# Patient Record
Sex: MALE | URBAN - NONMETROPOLITAN AREA
[De-identification: names, ages, dates, MRNs, and addresses within clinical notes are randomized per-mention and may not be internally consistent; named-entity substitution may affect disease eponyms.]

---

## 2023-11-09 ENCOUNTER — TELEPHONE (OUTPATIENT)
Dept: PRIMARY CARE | Facility: CLINIC | Age: 78
End: 2023-11-09

## 2024-01-24 ENCOUNTER — NURSING HOME VISIT (OUTPATIENT)
Dept: POST ACUTE CARE | Facility: EXTERNAL LOCATION | Age: 79
End: 2024-01-24

## 2024-01-24 DIAGNOSIS — D50.0 IRON DEFICIENCY ANEMIA DUE TO CHRONIC BLOOD LOSS: ICD-10-CM

## 2024-01-24 DIAGNOSIS — Z79.01 ANTICOAGULATION ADEQUATE: ICD-10-CM

## 2024-01-24 DIAGNOSIS — J43.1 PANLOBULAR EMPHYSEMA (MULTI): Primary | ICD-10-CM

## 2024-01-24 DIAGNOSIS — I10 PRIMARY HYPERTENSION: ICD-10-CM

## 2024-01-24 PROCEDURE — 99309 SBSQ NF CARE MODERATE MDM 30: CPT | Performed by: FAMILY MEDICINE

## 2024-01-24 NOTE — LETTER
Patient: Patrick Levy  : 1945    Encounter Date: 2024    Subjective  Patient ID: Patrick Levy is a 78 y.o. male who is long term resident being seen and evaluated for multiple medical problems.    HPI patient being seen now after being readmitted from the hospital.  He does have underlying COPD.  He has had worsening anemia and we have adjusted his Eliquis dosing as well as adjusted GI medications with continued decline noted.  Family deciding about possible hospice consult at this point.    Review of Systems   Constitutional:  Positive for fatigue. Negative for fever.   HENT:  Positive for congestion. Negative for sore throat.    Eyes:  Negative for visual disturbance.   Respiratory:  Positive for cough and shortness of breath. Negative for chest tightness.    Cardiovascular:  Negative for chest pain, palpitations and leg swelling.   Gastrointestinal:  Negative for abdominal pain, constipation, diarrhea, nausea and vomiting.   Genitourinary:  Negative for dysuria, frequency and urgency.   Musculoskeletal:  Positive for arthralgias and gait problem.   Skin:  Negative for rash.   Neurological:  Negative for dizziness, tremors, syncope and headaches.   Hematological:  Bruises/bleeds easily.   Psychiatric/Behavioral:  Negative for dysphoric mood. The patient is not nervous/anxious.        Objective  There were no vitals taken for this visit.    Physical Exam  Vitals (Weight 115 which is down 13 pounds since the end of December blood pressure 126/71 temp 98.1 pulse 90 pulse ox 97% with oxygen) reviewed.   Constitutional:       General: He is not in acute distress.     Appearance: Normal appearance.   HENT:      Head: Atraumatic.      Nose: Nose normal.      Mouth/Throat:      Mouth: Mucous membranes are dry.      Pharynx: Oropharynx is clear.   Eyes:      General: No scleral icterus.     Conjunctiva/sclera: Conjunctivae normal.   Cardiovascular:      Rate and Rhythm: Normal rate and regular rhythm.       Heart sounds:      No gallop.   Pulmonary:      Effort: Pulmonary effort is normal.      Breath sounds: Wheezing present.   Abdominal:      General: There is no distension.      Palpations: There is no mass.      Tenderness: There is no abdominal tenderness. There is no rebound.   Musculoskeletal:         General: No swelling.      Cervical back: Normal range of motion.   Lymphadenopathy:      Cervical: No cervical adenopathy.   Skin:     General: Skin is warm.      Coloration: Skin is not jaundiced.   Neurological:      General: No focal deficit present.         Assessment/Plan  Problem List Items Addressed This Visit    None  Visit Diagnoses         Codes    Panlobular emphysema (CMS/HCC)    -  Primary J43.1    Iron deficiency anemia due to chronic blood loss     D50.0    Anticoagulation adequate     Z79.01    Primary hypertension     I10        CBC CMP pending today.  Consider discontinuing Eliquis if his anemia continues to trend downward.  Continue with GI medications.  He has had significant weight loss over the last month and family is considering hospice consultation for end-stage COPD.     Goals    None           Electronically Signed By: Kath Morin MD   1/25/24  9:25 AM

## 2024-01-25 ASSESSMENT — ENCOUNTER SYMPTOMS
SORE THROAT: 0
FATIGUE: 1
NAUSEA: 0
CONSTIPATION: 0
NERVOUS/ANXIOUS: 0
ARTHRALGIAS: 1
PALPITATIONS: 0
VOMITING: 0
HEADACHES: 0
FEVER: 0
COUGH: 1
FREQUENCY: 0
DYSURIA: 0
TREMORS: 0
BRUISES/BLEEDS EASILY: 1
DYSPHORIC MOOD: 0
CHEST TIGHTNESS: 0
DIZZINESS: 0
DIARRHEA: 0
ABDOMINAL PAIN: 0
SHORTNESS OF BREATH: 1

## 2024-01-25 NOTE — PROGRESS NOTES
Subjective   Patient ID: Patrick Levy is a 78 y.o. male who is long term resident being seen and evaluated for multiple medical problems.    HPI patient being seen now after being readmitted from the hospital.  He does have underlying COPD.  He has had worsening anemia and we have adjusted his Eliquis dosing as well as adjusted GI medications with continued decline noted.  Family deciding about possible hospice consult at this point.    Review of Systems   Constitutional:  Positive for fatigue. Negative for fever.   HENT:  Positive for congestion. Negative for sore throat.    Eyes:  Negative for visual disturbance.   Respiratory:  Positive for cough and shortness of breath. Negative for chest tightness.    Cardiovascular:  Negative for chest pain, palpitations and leg swelling.   Gastrointestinal:  Negative for abdominal pain, constipation, diarrhea, nausea and vomiting.   Genitourinary:  Negative for dysuria, frequency and urgency.   Musculoskeletal:  Positive for arthralgias and gait problem.   Skin:  Negative for rash.   Neurological:  Negative for dizziness, tremors, syncope and headaches.   Hematological:  Bruises/bleeds easily.   Psychiatric/Behavioral:  Negative for dysphoric mood. The patient is not nervous/anxious.        Objective   There were no vitals taken for this visit.    Physical Exam  Vitals (Weight 115 which is down 13 pounds since the end of December blood pressure 126/71 temp 98.1 pulse 90 pulse ox 97% with oxygen) reviewed.   Constitutional:       General: He is not in acute distress.     Appearance: Normal appearance.   HENT:      Head: Atraumatic.      Nose: Nose normal.      Mouth/Throat:      Mouth: Mucous membranes are dry.      Pharynx: Oropharynx is clear.   Eyes:      General: No scleral icterus.     Conjunctiva/sclera: Conjunctivae normal.   Cardiovascular:      Rate and Rhythm: Normal rate and regular rhythm.      Heart sounds:      No gallop.   Pulmonary:      Effort: Pulmonary  effort is normal.      Breath sounds: Wheezing present.   Abdominal:      General: There is no distension.      Palpations: There is no mass.      Tenderness: There is no abdominal tenderness. There is no rebound.   Musculoskeletal:         General: No swelling.      Cervical back: Normal range of motion.   Lymphadenopathy:      Cervical: No cervical adenopathy.   Skin:     General: Skin is warm.      Coloration: Skin is not jaundiced.   Neurological:      General: No focal deficit present.         Assessment/Plan   Problem List Items Addressed This Visit    None  Visit Diagnoses         Codes    Panlobular emphysema (CMS/HCC)    -  Primary J43.1    Iron deficiency anemia due to chronic blood loss     D50.0    Anticoagulation adequate     Z79.01    Primary hypertension     I10        CBC CMP pending today.  Consider discontinuing Eliquis if his anemia continues to trend downward.  Continue with GI medications.  He has had significant weight loss over the last month and family is considering hospice consultation for end-stage COPD.     Goals    None

## 2024-02-21 ENCOUNTER — NURSING HOME VISIT (OUTPATIENT)
Dept: POST ACUTE CARE | Facility: EXTERNAL LOCATION | Age: 79
End: 2024-02-21

## 2024-02-21 DIAGNOSIS — D50.0 IRON DEFICIENCY ANEMIA DUE TO CHRONIC BLOOD LOSS: ICD-10-CM

## 2024-02-21 DIAGNOSIS — I10 PRIMARY HYPERTENSION: ICD-10-CM

## 2024-02-21 DIAGNOSIS — J43.1 PANLOBULAR EMPHYSEMA (MULTI): Primary | ICD-10-CM

## 2024-02-21 PROCEDURE — 99308 SBSQ NF CARE LOW MDM 20: CPT | Performed by: FAMILY MEDICINE

## 2024-02-21 ASSESSMENT — ENCOUNTER SYMPTOMS
CONSTIPATION: 0
DYSPHORIC MOOD: 0
COUGH: 1
NAUSEA: 0
BRUISES/BLEEDS EASILY: 1
VOMITING: 0
SHORTNESS OF BREATH: 1
NERVOUS/ANXIOUS: 0
FATIGUE: 1
ABDOMINAL PAIN: 0
FREQUENCY: 0
SORE THROAT: 0
ARTHRALGIAS: 1
DIARRHEA: 0
CHEST TIGHTNESS: 0
HEADACHES: 0
DIZZINESS: 0
PALPITATIONS: 0
TREMORS: 0
DYSURIA: 0
FEVER: 0

## 2024-02-21 NOTE — PROGRESS NOTES
Subjective   Patient ID: Patrick Levy is a 79 y.o. male who is long term resident being seen and evaluated for multiple medical problems.    HPI patient being seen for monthly visit.  He continues to decline with his COPD as well as multiple cardiac issues.  He also has had some cognitive decline.  He was open took hospice consultation earlier this month.  He continues to decline as expected and medications are being discontinued as appropriate.      Review of Systems   Constitutional:  Positive for fatigue. Negative for fever.   HENT:  Positive for congestion. Negative for sore throat.    Eyes:  Negative for visual disturbance.   Respiratory:  Positive for cough and shortness of breath. Negative for chest tightness.    Cardiovascular:  Negative for chest pain, palpitations and leg swelling.   Gastrointestinal:  Negative for abdominal pain, constipation, diarrhea, nausea and vomiting.   Genitourinary:  Negative for dysuria, frequency and urgency.   Musculoskeletal:  Positive for arthralgias and gait problem.   Skin:  Negative for rash.   Neurological:  Negative for dizziness, tremors, syncope and headaches.   Hematological:  Bruises/bleeds easily.   Psychiatric/Behavioral:  Negative for dysphoric mood. The patient is not nervous/anxious.        Objective   There were no vitals taken for this visit.    Physical Exam  Vitals (Weight 114 down one more lb from jan 123/72 97.9 78 97% with O2) reviewed.   Constitutional:       General: He is not in acute distress.     Appearance: Normal appearance.   HENT:      Head: Atraumatic.      Nose: Nose normal.      Mouth/Throat:      Mouth: Mucous membranes are dry.      Pharynx: Oropharynx is clear.   Eyes:      General: No scleral icterus.     Conjunctiva/sclera: Conjunctivae normal.   Cardiovascular:      Rate and Rhythm: Normal rate and regular rhythm.      Heart sounds:      No gallop.   Pulmonary:      Effort: Pulmonary effort is normal.      Breath sounds: Wheezing  present.   Abdominal:      General: There is no distension.      Palpations: There is no mass.      Tenderness: There is no abdominal tenderness. There is no rebound.   Musculoskeletal:         General: No swelling.      Cervical back: Normal range of motion.   Lymphadenopathy:      Cervical: No cervical adenopathy.   Skin:     General: Skin is warm.      Coloration: Skin is not jaundiced.   Neurological:      General: No focal deficit present.         Assessment/Plan   Problem List Items Addressed This Visit    None  Visit Diagnoses         Codes    Panlobular emphysema (CMS/HCC)    -  Primary J43.1    Primary hypertension     I10    Iron deficiency anemia due to chronic blood loss     D50.0        Continue with hospice care and withdrawal of medications as appropriate.   Goals    None

## 2024-02-21 NOTE — LETTER
Patient: Patrick Levy  : 1945    Encounter Date: 2024    Subjective  Patient ID: Patrick Levy is a 79 y.o. male who is long term resident being seen and evaluated for multiple medical problems.    HPI patient being seen for monthly visit.  He continues to decline with his COPD as well as multiple cardiac issues.  He also has had some cognitive decline.  He was open took hospice consultation earlier this month.  He continues to decline as expected and medications are being discontinued as appropriate.      Review of Systems   Constitutional:  Positive for fatigue. Negative for fever.   HENT:  Positive for congestion. Negative for sore throat.    Eyes:  Negative for visual disturbance.   Respiratory:  Positive for cough and shortness of breath. Negative for chest tightness.    Cardiovascular:  Negative for chest pain, palpitations and leg swelling.   Gastrointestinal:  Negative for abdominal pain, constipation, diarrhea, nausea and vomiting.   Genitourinary:  Negative for dysuria, frequency and urgency.   Musculoskeletal:  Positive for arthralgias and gait problem.   Skin:  Negative for rash.   Neurological:  Negative for dizziness, tremors, syncope and headaches.   Hematological:  Bruises/bleeds easily.   Psychiatric/Behavioral:  Negative for dysphoric mood. The patient is not nervous/anxious.        Objective  There were no vitals taken for this visit.    Physical Exam  Vitals (Weight 114 down one more lb from  97.9 78 97% with O2) reviewed.   Constitutional:       General: He is not in acute distress.     Appearance: Normal appearance.   HENT:      Head: Atraumatic.      Nose: Nose normal.      Mouth/Throat:      Mouth: Mucous membranes are dry.      Pharynx: Oropharynx is clear.   Eyes:      General: No scleral icterus.     Conjunctiva/sclera: Conjunctivae normal.   Cardiovascular:      Rate and Rhythm: Normal rate and regular rhythm.      Heart sounds:      No gallop.   Pulmonary:       Effort: Pulmonary effort is normal.      Breath sounds: Wheezing present.   Abdominal:      General: There is no distension.      Palpations: There is no mass.      Tenderness: There is no abdominal tenderness. There is no rebound.   Musculoskeletal:         General: No swelling.      Cervical back: Normal range of motion.   Lymphadenopathy:      Cervical: No cervical adenopathy.   Skin:     General: Skin is warm.      Coloration: Skin is not jaundiced.   Neurological:      General: No focal deficit present.         Assessment/Plan  Problem List Items Addressed This Visit    None  Visit Diagnoses         Codes    Panlobular emphysema (CMS/HCC)    -  Primary J43.1    Primary hypertension     I10    Iron deficiency anemia due to chronic blood loss     D50.0        Continue with hospice care and withdrawal of medications as appropriate.   Goals    None           Electronically Signed By: Kath Morin MD   2/21/24  2:35 PM

## 2024-03-20 ENCOUNTER — NURSING HOME VISIT (OUTPATIENT)
Dept: POST ACUTE CARE | Facility: EXTERNAL LOCATION | Age: 79
End: 2024-03-20

## 2024-03-20 DIAGNOSIS — J43.1 PANLOBULAR EMPHYSEMA (MULTI): ICD-10-CM

## 2024-03-20 DIAGNOSIS — D50.0 IRON DEFICIENCY ANEMIA DUE TO CHRONIC BLOOD LOSS: ICD-10-CM

## 2024-03-20 DIAGNOSIS — I10 PRIMARY HYPERTENSION: Primary | ICD-10-CM

## 2024-03-20 PROCEDURE — 99308 SBSQ NF CARE LOW MDM 20: CPT | Performed by: FAMILY MEDICINE

## 2024-03-20 ASSESSMENT — ENCOUNTER SYMPTOMS
CHEST TIGHTNESS: 0
DIARRHEA: 0
FATIGUE: 1
DYSURIA: 0
CONSTIPATION: 0
NERVOUS/ANXIOUS: 0
ABDOMINAL PAIN: 0
COUGH: 1
NAUSEA: 0
DIZZINESS: 0
PALPITATIONS: 0
SORE THROAT: 0
FEVER: 0
FREQUENCY: 0
ARTHRALGIAS: 1
DYSPHORIC MOOD: 0
TREMORS: 0
HEADACHES: 0
SHORTNESS OF BREATH: 1
VOMITING: 0
CONFUSION: 1

## 2024-03-20 NOTE — PROGRESS NOTES
Subjective   Patient ID: Patrick Levy is a 79 y.o. male who is long term resident being seen and evaluated for multiple medical problems.    HPI patient being seen for monthly visit.  He continues to decline with his COPD as well as multiple cardiac issues.  He also has had some cognitive decline.  He was open to hospice consultation and has had some overall weight loss since December.       Review of Systems   Constitutional:  Positive for fatigue. Negative for fever.   HENT:  Negative for congestion and sore throat.    Eyes:  Negative for visual disturbance.   Respiratory:  Positive for cough and shortness of breath. Negative for chest tightness.    Cardiovascular:  Negative for chest pain, palpitations and leg swelling.   Gastrointestinal:  Negative for abdominal pain, constipation, diarrhea, nausea and vomiting.   Genitourinary:  Negative for dysuria, frequency and urgency.   Musculoskeletal:  Positive for arthralgias and gait problem.   Skin:  Negative for rash.   Neurological:  Negative for dizziness, tremors, syncope and headaches.   Psychiatric/Behavioral:  Positive for confusion. Negative for dysphoric mood. The patient is not nervous/anxious.        Objective   There were no vitals taken for this visit.    Physical Exam  Vitals (Weight is 114 which is stable compared to last month but down 14 pounds from December blood pressure 108/64 temp 97.8 pulse 78 pulse ox 97% on oxygen) reviewed.   Constitutional:       General: He is not in acute distress.     Appearance: Normal appearance.   HENT:      Head: Atraumatic.      Nose: Nose normal.      Mouth/Throat:      Mouth: Mucous membranes are dry.      Pharynx: Oropharynx is clear.   Eyes:      General: No scleral icterus.     Conjunctiva/sclera: Conjunctivae normal.   Cardiovascular:      Rate and Rhythm: Normal rate and regular rhythm.      Heart sounds:      No gallop.   Pulmonary:      Effort: Pulmonary effort is normal.      Breath sounds: Wheezing  present.   Abdominal:      General: There is no distension.      Palpations: There is no mass.      Tenderness: There is no abdominal tenderness. There is no rebound.   Musculoskeletal:         General: No swelling.      Cervical back: Normal range of motion.   Lymphadenopathy:      Cervical: No cervical adenopathy.   Skin:     General: Skin is warm.      Coloration: Skin is not jaundiced.   Neurological:      Mental Status: Mental status is at baseline.         Assessment/Plan   Problem List Items Addressed This Visit    None  Visit Diagnoses         Codes    Primary hypertension    -  Primary I10    Panlobular emphysema (CMS/HCC)     J43.1    Iron deficiency anemia due to chronic blood loss     D50.0        Continue with hospice care and withdrawal of medications as appropriate.   Goals    None

## 2024-03-20 NOTE — LETTER
Patient: Patrick Levy  : 1945    Encounter Date: 2024    Subjective  Patient ID: Patrick Levy is a 79 y.o. male who is long term resident being seen and evaluated for multiple medical problems.    HPI patient being seen for monthly visit.  He continues to decline with his COPD as well as multiple cardiac issues.  He also has had some cognitive decline.  He was open to hospice consultation and has had some overall weight loss since December.       Review of Systems   Constitutional:  Positive for fatigue. Negative for fever.   HENT:  Negative for congestion and sore throat.    Eyes:  Negative for visual disturbance.   Respiratory:  Positive for cough and shortness of breath. Negative for chest tightness.    Cardiovascular:  Negative for chest pain, palpitations and leg swelling.   Gastrointestinal:  Negative for abdominal pain, constipation, diarrhea, nausea and vomiting.   Genitourinary:  Negative for dysuria, frequency and urgency.   Musculoskeletal:  Positive for arthralgias and gait problem.   Skin:  Negative for rash.   Neurological:  Negative for dizziness, tremors, syncope and headaches.   Psychiatric/Behavioral:  Positive for confusion. Negative for dysphoric mood. The patient is not nervous/anxious.        Objective  There were no vitals taken for this visit.    Physical Exam  Vitals (Weight is 114 which is stable compared to last month but down 14 pounds from December blood pressure 108/64 temp 97.8 pulse 78 pulse ox 97% on oxygen) reviewed.   Constitutional:       General: He is not in acute distress.     Appearance: Normal appearance.   HENT:      Head: Atraumatic.      Nose: Nose normal.      Mouth/Throat:      Mouth: Mucous membranes are dry.      Pharynx: Oropharynx is clear.   Eyes:      General: No scleral icterus.     Conjunctiva/sclera: Conjunctivae normal.   Cardiovascular:      Rate and Rhythm: Normal rate and regular rhythm.      Heart sounds:      No gallop.   Pulmonary:       Effort: Pulmonary effort is normal.      Breath sounds: Wheezing present.   Abdominal:      General: There is no distension.      Palpations: There is no mass.      Tenderness: There is no abdominal tenderness. There is no rebound.   Musculoskeletal:         General: No swelling.      Cervical back: Normal range of motion.   Lymphadenopathy:      Cervical: No cervical adenopathy.   Skin:     General: Skin is warm.      Coloration: Skin is not jaundiced.   Neurological:      Mental Status: Mental status is at baseline.         Assessment/Plan  Problem List Items Addressed This Visit    None  Visit Diagnoses         Codes    Primary hypertension    -  Primary I10    Panlobular emphysema (CMS/HCC)     J43.1    Iron deficiency anemia due to chronic blood loss     D50.0        Continue with hospice care and withdrawal of medications as appropriate.   Goals    None           Electronically Signed By: Kath Morin MD   3/20/24  2:54 PM

## 2024-05-01 ENCOUNTER — NURSING HOME VISIT (OUTPATIENT)
Dept: POST ACUTE CARE | Facility: EXTERNAL LOCATION | Age: 79
End: 2024-05-01

## 2024-05-01 DIAGNOSIS — I10 PRIMARY HYPERTENSION: ICD-10-CM

## 2024-05-01 DIAGNOSIS — J43.1 PANLOBULAR EMPHYSEMA (MULTI): Primary | ICD-10-CM

## 2024-05-01 DIAGNOSIS — D50.0 IRON DEFICIENCY ANEMIA DUE TO CHRONIC BLOOD LOSS: ICD-10-CM

## 2024-05-01 PROCEDURE — 99308 SBSQ NF CARE LOW MDM 20: CPT | Performed by: FAMILY MEDICINE

## 2024-05-01 ASSESSMENT — ENCOUNTER SYMPTOMS
ABDOMINAL PAIN: 0
CONSTIPATION: 0
VOMITING: 0
ARTHRALGIAS: 1
DYSURIA: 0
FREQUENCY: 0
SORE THROAT: 0
CHEST TIGHTNESS: 0
CONFUSION: 1
DIARRHEA: 0
DIZZINESS: 0
DYSPHORIC MOOD: 0
FEVER: 0
PALPITATIONS: 0
FATIGUE: 1
COUGH: 1
NAUSEA: 0
NERVOUS/ANXIOUS: 0
SHORTNESS OF BREATH: 1
TREMORS: 0
HEADACHES: 0

## 2024-05-01 NOTE — LETTER
Patient: Patrick Levy  : 1945    Encounter Date: 2024    Subjective  Patient ID: Patrick Levy is a 79 y.o. male who is long term resident being seen and evaluated for multiple medical problems.    HPI patient being seen for monthly visit.  He continues to decline with his COPD as well as multiple cardiac issues.  He also has had some cognitive decline.  He is open to [facility] care.  He continues to decline as expected with significant weight loss noted.  Continues to need oxygen support as well.        Review of Systems   Constitutional:  Positive for fatigue. Negative for fever.   HENT:  Negative for congestion and sore throat.    Eyes:  Negative for visual disturbance.   Respiratory:  Positive for cough and shortness of breath. Negative for chest tightness.    Cardiovascular:  Negative for chest pain, palpitations and leg swelling.   Gastrointestinal:  Negative for abdominal pain, constipation, diarrhea, nausea and vomiting.   Genitourinary:  Negative for dysuria, frequency and urgency.   Musculoskeletal:  Positive for arthralgias and gait problem.   Skin:  Negative for rash.   Neurological:  Negative for dizziness, tremors, syncope and headaches.   Psychiatric/Behavioral:  Positive for confusion. Negative for dysphoric mood. The patient is not nervous/anxious.        Objective  There were no vitals taken for this visit.    Physical Exam  Vitals (Weight 111 which is down 3 pounds from previous blood pressure 111/69 temp 98.5 pulse 99 pulse ox 95% with oxygen) reviewed.   Constitutional:       General: He is not in acute distress.     Appearance: Normal appearance.   HENT:      Head: Atraumatic.      Nose: Nose normal.      Mouth/Throat:      Mouth: Mucous membranes are dry.      Pharynx: Oropharynx is clear.   Eyes:      General: No scleral icterus.     Conjunctiva/sclera: Conjunctivae normal.   Cardiovascular:      Rate and Rhythm: Normal rate and regular rhythm.      Heart sounds:       No gallop.   Pulmonary:      Effort: Pulmonary effort is normal.      Breath sounds: Wheezing and rhonchi present.   Abdominal:      General: There is no distension.      Palpations: There is no mass.      Tenderness: There is no abdominal tenderness. There is no rebound.   Musculoskeletal:         General: No swelling.      Cervical back: Normal range of motion.   Lymphadenopathy:      Cervical: No cervical adenopathy.   Skin:     General: Skin is warm.      Coloration: Skin is not jaundiced.   Neurological:      Mental Status: Mental status is at baseline.         Assessment/Plan  Problem List Items Addressed This Visit    None  Visit Diagnoses         Codes    Panlobular emphysema (Multi)    -  Primary J43.1    Primary hypertension     I10    Iron deficiency anemia due to chronic blood loss     D50.0          Continue with hospice care and withdrawal of medications as appropriate.   Goals    None           Electronically Signed By: Kath Morin MD   5/1/24  2:45 PM

## 2024-05-01 NOTE — PROGRESS NOTES
Subjective   Patient ID: Patrick Levy is a 79 y.o. male who is long term resident being seen and evaluated for multiple medical problems.    HPI patient being seen for monthly visit.  He continues to decline with his COPD as well as multiple cardiac issues.  He also has had some cognitive decline.  He is open to [facility] care.  He continues to decline as expected with significant weight loss noted.  Continues to need oxygen support as well.        Review of Systems   Constitutional:  Positive for fatigue. Negative for fever.   HENT:  Negative for congestion and sore throat.    Eyes:  Negative for visual disturbance.   Respiratory:  Positive for cough and shortness of breath. Negative for chest tightness.    Cardiovascular:  Negative for chest pain, palpitations and leg swelling.   Gastrointestinal:  Negative for abdominal pain, constipation, diarrhea, nausea and vomiting.   Genitourinary:  Negative for dysuria, frequency and urgency.   Musculoskeletal:  Positive for arthralgias and gait problem.   Skin:  Negative for rash.   Neurological:  Negative for dizziness, tremors, syncope and headaches.   Psychiatric/Behavioral:  Positive for confusion. Negative for dysphoric mood. The patient is not nervous/anxious.        Objective   There were no vitals taken for this visit.    Physical Exam  Vitals (Weight 111 which is down 3 pounds from previous blood pressure 111/69 temp 98.5 pulse 99 pulse ox 95% with oxygen) reviewed.   Constitutional:       General: He is not in acute distress.     Appearance: Normal appearance.   HENT:      Head: Atraumatic.      Nose: Nose normal.      Mouth/Throat:      Mouth: Mucous membranes are dry.      Pharynx: Oropharynx is clear.   Eyes:      General: No scleral icterus.     Conjunctiva/sclera: Conjunctivae normal.   Cardiovascular:      Rate and Rhythm: Normal rate and regular rhythm.      Heart sounds:      No gallop.   Pulmonary:      Effort: Pulmonary effort is normal.       Breath sounds: Wheezing and rhonchi present.   Abdominal:      General: There is no distension.      Palpations: There is no mass.      Tenderness: There is no abdominal tenderness. There is no rebound.   Musculoskeletal:         General: No swelling.      Cervical back: Normal range of motion.   Lymphadenopathy:      Cervical: No cervical adenopathy.   Skin:     General: Skin is warm.      Coloration: Skin is not jaundiced.   Neurological:      Mental Status: Mental status is at baseline.         Assessment/Plan   Problem List Items Addressed This Visit    None  Visit Diagnoses         Codes    Panlobular emphysema (Multi)    -  Primary J43.1    Primary hypertension     I10    Iron deficiency anemia due to chronic blood loss     D50.0          Continue with hospice care and withdrawal of medications as appropriate.   Goals    None